# Patient Record
Sex: MALE | Race: WHITE | NOT HISPANIC OR LATINO | Employment: UNEMPLOYED | URBAN - METROPOLITAN AREA
[De-identification: names, ages, dates, MRNs, and addresses within clinical notes are randomized per-mention and may not be internally consistent; named-entity substitution may affect disease eponyms.]

---

## 2017-05-15 ENCOUNTER — HOSPITAL ENCOUNTER (EMERGENCY)
Facility: HOSPITAL | Age: 27
Discharge: HOME | End: 2017-05-16
Attending: EMERGENCY MEDICINE
Payer: COMMERCIAL

## 2017-05-15 DIAGNOSIS — Z98.2 VP (VENTRICULOPERITONEAL) SHUNT STATUS: ICD-10-CM

## 2017-05-15 DIAGNOSIS — R51.9 HEADACHE, UNSPECIFIED HEADACHE TYPE: Primary | ICD-10-CM

## 2017-05-15 PROCEDURE — 96372 THER/PROPH/DIAG INJ SC/IM: CPT

## 2017-05-15 PROCEDURE — 99283 EMERGENCY DEPT VISIT LOW MDM: CPT | Mod: 25

## 2017-05-15 RX ORDER — KETOROLAC TROMETHAMINE 30 MG/ML
60 INJECTION, SOLUTION INTRAMUSCULAR; INTRAVENOUS
Status: COMPLETED | OUTPATIENT
Start: 2017-05-16 | End: 2017-05-16

## 2017-05-15 NOTE — ED AVS SNAPSHOT
[facility]  [address] [facility] [address]               Horacio Price   5/15/2017 11:21 PM   ED    Description:  Male : 1990   Department:  [facility]           Your Care was Coordinated By:     Provider Role From To    Vanessa Hutchison MD Attending Provider 05/15/17 8515 --      Reason for Visit     Brain Shunt Problem           Diagnoses this Visit        Comments    Headache, unspecified headache type    -  Primary      (ventriculoperitoneal) shunt status           ED Disposition     ED Disposition Condition Comment    Discharge             To Do List           Follow-up Information     Follow up with Ailyn Tripp MD In 2 days.    Specialty:  Family Medicine    Contact information:    [address] [facility] [address]  [facility] [id number] [phone]          Follow up with [facility].    Specialty:  Emergency Medicine    Why:  As needed, If symptoms worsen    Contact information:    [address] [place] [id number] [phone]      [facility]     [facility] -  Assistance  Unless otherwise directed by your provider, please contact [facility], our nurse care line that is available for  assistance.     Registered nurses in the [facility] provide: appointment scheduling, clinical advisement, health education, and other advisory services.  Call: [phone] (toll free)               Medications           Message regarding Medications     Verify the changes and/or additions to your medication regime listed below are the same as discussed with your clinician today.  If any of these changes or additions are incorrect, please notify your healthcare provider.        These medications were administered today        Dose Freq    ketorolac injection 60 mg 60 mg ED 1 Time    Starting on: 2017    Sig: Inject 2 mLs (60 mg total) into the muscle ED 1  "Time.    Class: Normal    Route: Intramuscular    Non-formulary Exception Code: Defer to pharmacy           Verify that the below list of medications is an accurate representation of the medications you are currently taking.  If none reported, the list may be blank. If incorrect, please contact your healthcare provider. Carry this list with you in case of emergency.           Current Medications            Clinical Reference Information           Your Vitals Were     BP Pulse Temp Resp Height Weight    123/70 (BP Location: Right arm, Patient Position: Sitting) 62 98.5 °F (36.9 °C) (Oral) 18 5' 7" (1.702 m) 80.7 kg (178 lb)    SpO2 BMI             95% 27.88 kg/m2         Allergies as of 5/16/2017        Reactions    Biaxin [Clarithromycin] Rash    Rocephin [Ceftriaxone] Rash    Suprax [Cefixime] Rash      Immunizations Administered on Date of Encounter - 5/16/2017     None      ED Micro, Lab, POCT     None      ED Imaging Orders     Start Ordered       Status Ordering Provider    05/15/17 2346 05/15/17 2345  X-Ray Shunt Series  1 time imaging      In process         Discharge Instructions         Self-Care for Headaches  Most headaches aren't serious and can be relieved with self-care. But some headaches may be a sign of another health problem like eye trouble or high blood pressure. To find the best treatment, learn what kind of headaches you get. For tension headaches, self-care will usually help. To treat migraines, ask your healthcare provider for advice. It is also possible to get both tension and migraine headaches. Self-care involves relieving the pain and avoiding headache triggers if you can.    Ways to reduce pain and tension  Try these steps:  · Apply a cold compress or ice pack to the pain site.  · Drink fluids. If nausea makes it hard to drink, try sucking on ice.  · Rest. Protect yourself from bright light and loud noises.  · Calm your emotions by imagining a peaceful scene.  · Massage tight neck, " "shoulder, and head muscles.  · To relax muscles, soak in a hot bath or use a hot shower.  Use medicines  Aspirin or aspirin substitutes, such as ibuprofen and acetaminophen, can relieve headache. Remember: Never give aspirin to anyone 18 years old or younger because of the risk of developing Reye syndrome. Use pain medicines only when necessary.  Track your headaches  Keeping a headache diary can help you and your healthcare provider identify what's causing your headaches:  · Note when each headache happens.  · Identify your activities and the foods you've eaten 6 to 8 hours before the headache began.  · Look for any trends or "triggers."  Signs of tension headache  Any of the following can be signs:  · Dull pain or feeling of pressure in a tight band around your head  · Pain in your neck or shoulders  · Headache without a definite beginning or end  · Headache after an activity such as driving or working on a computer  Signs of migraine  Any of the following can be signs:  · Throbbing pain on one or both sides of your head  · Nausea or vomiting  · Extreme sensitivity to light, sound, and smells  · Bright spots, flashes, or other visual changes  · Pain or nausea so severe that you can't continue your daily activities  Call your healthcare provider   If you have any of the following symptoms, contact your healthcare provider:  · A headache that lingers after a recent injury or bump to the head.  · A fever with a stiff neck or pain when you bend your head toward your chest.  · A headache along with slurred speech, changes in your vision, or numbness or weakness in your arms or legs.  · A headache for longer than 3 days.  · Frequent headaches, especially in the morning.  · Headaches with seizures   · Seek immediate medical attention if you have a headache that you would call "the worst headache you have ever had."   Date Last Reviewed: 10/4/2015  © 7388-1883 [employer]. [address] [place] [address], [facility] " "[address]. All rights reserved. This information is not intended as a substitute for professional medical care. Always follow your healthcare professional's instructions.          Headache, Unspecified    A number of things can cause headaches. The cause of your headache isnt clear. But it doesnt seem to be a sign of any serious illness.  You could have a tension headache or a migraine headache.  Stress can cause a tension headache. This can happen if you tense the muscles of your shoulders, neck, and scalp without knowing it. If this stress lasts long enough, you may develop a tension headache.  It is not clear why migraines occur, but certain things called" triggers" can raise the risk of having a migraine attack. Migraine triggers may include emotional stress or depression, or by hormone changes during the menstrual cycle. Other triggers include birth control pills and other medicines, alcohol or caffeine, foods with tyramine (such as aged cheese, wine), eyestrain, weather changes, missed meals, and lack of sleep or oversleeping.  Other causes of headache include:  · Viral illness with high fever  · Head injury with concussion  · Sinus, ear, or throat infection  · Dental pain and jaw joint (TMJ) pain  More serious but less common causes of headache include stroke, brain hemorrhage, brain tumor, meningitis, and encephalitis.  Home care  Follow these tips when taking care of yourself at home:  · Dont drive yourself home if you were given pain medicine for your headache. Instead, have someone else drive you home. Try to sleep when you get home. You should feel much better when you wake up.  · Apply heat to the back of your neck to ease a neck muscle spasm. Take care of a migraine headache by putting an ice pack on your forehead or at the base of your skull.  · If you have nausea or vomiting, eat a light diet until your headache eases.  · If you have a migraine headache, use sunglasses when in the daylight or around " bright indoor lighting until your symptoms get better. Bright glaring light can make this type of headache worse.  Follow-up care  Follow up with your healthcare provider, or as advised. Talk with your provider if you have frequent headaches. He or she can help figure out a treatment plan. By knowing the earliest signs of headache, and starting treatment right away, you may be able to stop the pain yourself.  When to seek medical advice  Call your healthcare provider right away if any of these occur:  · Your head pain suddenly gets worse after sexual intercourse or strenuous activity  · Your head pain doesnt get better within 24 hours  · You arent able to keep liquids down (repeated vomiting)  · Fever of 100.4ºF (38ºC) or higher, or as directed by your healthcare provider  · Stiff neck  · Extreme drowsiness, confusion, or fainting  · Dizziness or dizziness with spinning sensation (vertigo)  · Weakness in an arm or leg or one side of your face  · You have trouble talking or seeing  Date Last Reviewed: 8/1/2016  © 5558-5596 [employer]. [address] [place] [address]. All rights reserved. This information is not intended as a substitute for professional medical care. Always follow your healthcare professional's instructions.          [facility] Sign-Up     Activating your [facility] account is as easy as 1-2-3!     1) Visit [email or web address], select Sign Up Now, enter this activation code and your date of birth, then select Next.  [email or web address]  Expires: 6/30/2017  1:44 AM      2) Create a username and password to use when you visit [facility] in the future and select a security question in case you lose your password and select Next.    3) Enter your e-mail address and click Sign Up!    Additional Information  If you have questions, please e-mail [email or web address] or call [phone] to talk to our [facility] staff. Remember, [facility] is NOT to be used for urgent needs. For medical  emergencies, dial 911.         Smoking Cessation     If you would like to quit smoking:   You may be eligible for free services if you are a [place] resident and started smoking cigarettes before September 1, 1988.  Call the Smoking Cessation Trust (SCT) toll free at (564) 821-1114 or (430) 791-0606.   Call 1-800-QUIT-NOW if you do not meet the above criteria.   Contact us via email: [email or web address]   View our website for more information: [email or web address]         [facility]  complies with applicable Federal civil rights laws and does not discriminate on the basis of race, color, national origin, age, disability, or sex.        Language Assistance Services     ATTENTION: Language assistance services are available, free of charge. Please call [phone].      ATENCIÓN: Si habla español, tiene a tena disposición servicios gratuitos de asistencia lingüística. Llame al [phone].     CHÚ Ý: N?u b?n nói Ti?ng Vi?t, có các d?ch v? h? tr? ngôn ng? mi?n phí dành cho b?n. G?i s? [phone].

## 2017-05-16 VITALS
WEIGHT: 178 LBS | OXYGEN SATURATION: 98 % | HEIGHT: 67 IN | DIASTOLIC BLOOD PRESSURE: 84 MMHG | RESPIRATION RATE: 18 BRPM | SYSTOLIC BLOOD PRESSURE: 127 MMHG | BODY MASS INDEX: 27.94 KG/M2 | TEMPERATURE: 99 F | HEART RATE: 71 BPM

## 2017-05-16 PROCEDURE — 63600175 PHARM REV CODE 636 W HCPCS: Performed by: EMERGENCY MEDICINE

## 2017-05-16 RX ADMIN — KETOROLAC TROMETHAMINE 60 MG: 60 INJECTION, SOLUTION INTRAMUSCULAR at 12:05

## 2017-05-16 NOTE — ED NOTES
In bed resting,aaox4,skin warm dry to touch,equal bilateral chest rise and fall,side rails up x 2,bed locked and in low position,instructed to call with any needs.

## 2017-05-16 NOTE — ED PROVIDER NOTES
"SCRIBE #1 NOTE: I, Yahaira Barr, am scribing for, and in the presence of, Vanessa Hutchison MD. I have scribed the entire note.      History      Chief Complaint   Patient presents with    Brain Shunt Problem     reports having brain shunt, mother reports having resistance with pumping the shunt up to relieve pressure. reports neurologist changed pain medications as well        Review of patient's allergies indicates:   Allergen Reactions    Biaxin [clarithromycin] Rash    Rocephin [ceftriaxone] Rash    Suprax [cefixime] Rash        HPI   HPI    5/15/2017, 11:46 PM   History obtained from the patient and mother      History of Present Illness: Horacio Price is a 26 y.o. male patient who presents to the Emergency Department for HA which onset gradually since this morning. Symptoms are constant and moderate in severity. Mother reports pt has been on migraine medication. Mother states pt saw Dr. Pop Pemberton last week when he increased his Topamax to 3 times a day and gave him new pain medication. Mother states she was unable to get medications filled. Mother states reports pumping the brain shunt to relieve pressure but believes the shunt isn't working because "the pump is not draining when it usually takes 2-5 minutes to refill and deflate". Pt states lights and loud noises exacerbates the pain. No mitigating or exacerbating factors reported. Associated sxs include dizziness and nausea. Patient denies any rash, CP, SOB, fever, chills, emesis, diarrhea, and all other sxs at this time. Prior Tx includes Tylenol with no relief. No further complaints or concerns at this time.         Arrival mode: Personal vehicle    PCP: Ailyn Tripp MD     Past Medical History:  Past medical history reviewed not relevant      Past Surgical History:  Past surgical history reviewed not relevant      Family History:  Family history reviewed not relevant      Social History:  Social History    Social History Main " Topics    Social History Main Topics    Smoking status: Unknown if ever smoked    Smokeless tobacco: Unknown if ever used    Alcohol Use: Unknown drinking history    Drug Use: Unknown if ever used    Sexual Activity: Unknown       ROS   Review of Systems   Constitutional: Negative for chills and fever.   HENT: Negative for sore throat.    Respiratory: Negative for shortness of breath.    Cardiovascular: Negative for chest pain.   Gastrointestinal: Positive for nausea. Negative for diarrhea and vomiting.   Genitourinary: Negative for dysuria.   Musculoskeletal: Negative for back pain.   Skin: Negative for rash.   Neurological: Positive for dizziness and headaches. Negative for weakness.   Hematological: Does not bruise/bleed easily.   All other systems reviewed and are negative.      Physical Exam    Initial Vitals   BP Pulse Resp Temp SpO2   05/15/17 2301 05/15/17 2301 05/15/17 2301 05/15/17 2301 05/15/17 2301   123/70 62 18 98.5 °F (36.9 °C) 95 %      Physical Exam  Nursing Notes and Vital Signs Reviewed.  Constitutional: Patient is in no acute distress. Well-developed and well-nourished.  Head: Atraumatic. Normocephalic.  Eyes: PERRL. EOM intact. Conjunctivae are not pale. No scleral icterus.  ENT: Mucous membranes are moist. Oropharynx is clear and symmetric.    Neck: Supple. Full ROM. No lymphadenopathy.  Cardiovascular: Regular rate. Regular rhythm. No murmurs, rubs, or gallops. Distal pulses are 2+ and symmetric.  Pulmonary/Chest: No respiratory distress. Clear to auscultation bilaterally. No wheezing, rales, or rhonchi.  Abdominal: Soft and non-distended.  There is no tenderness.  No rebound, guarding, or rigidity. Good bowel sounds.  Genitourinary: No CVA tenderness  Musculoskeletal: Moves all extremities. No obvious deformities. No edema. No calf tenderness.  Skin: Warm and dry.  Neurological:  Alert, awake, and appropriate.  Normal speech.  No acute focal neurological deficits are  "appreciated.  Psychiatric: Normal affect. Good eye contact. Appropriate in content.    ED Course    Procedures  ED Vital Signs:  Vitals:    05/15/17 2301 05/16/17 0152   BP: 123/70 127/84   Pulse: 62 71   Resp: 18 18   Temp: 98.5 °F (36.9 °C)    TempSrc: Oral    SpO2: 95% 98%   Weight: 80.7 kg (178 lb)    Height: 5' 7" (1.702 m)        Imaging Results:  Imaging Results         X-Ray Shunt Series (In process) Per Virtual radiology, pt's CT results the left-sided  shunt appears intact.                    The Emergency Provider reviewed the vital signs and test results, which are outlined above.    ED Discussion     1:15 AM: Re-evaluated pt. Pt is resting comfortably and is in no acute distress.  Pt states he "feels a lot better".  D/w pt all pertinent results. D/w pt any concerns expressed at this time. Answered all questions. Pt expresses understanding at this time.    1:53 AM:Discussed with pt all pertinent ED information and results. Discussed pt dx and plan of tx. Gave pt all f/u and return to the ED instructions. All questions and concerns were addressed at this time. Pt expresses understanding of information and instructions, and is comfortable with plan to discharge. Pt is stable for discharge.        ED Medication(s):  Medications   ketorolac injection 60 mg (60 mg Intramuscular Given 5/16/17 0017)       There are no discharge medications for this patient.      Follow-up Information     Follow up with Ailyn Tripp MD In 2 days.    Specialty:  Family Medicine    Contact information:    [address] [facility] [address]  [facility] [address] [phone]          Follow up with [facility].    Specialty:  Emergency Medicine    Why:  As needed, If symptoms worsen    Contact information:    [address]  [facility] [address] [phone]            Medical Decision Making    Medical Decision Making:   Clinical Tests:   Radiological Study: Ordered and Reviewed           Scribe " Attestation:   Scribe #1: I performed the above scribed service and the documentation accurately describes the services I performed. I attest to the accuracy of the note.    Attending:   Physician Attestation Statement for Scribe #1: I, Vanessa Hutchison MD, personally performed the services described in this documentation, as scribed by Yahaira Barr, in my presence, and it is both accurate and complete.          Clinical Impression       ICD-10-CM ICD-9-CM   1. Headache, unspecified headache type R51 784.0   2.  (ventriculoperitoneal) shunt status Z98.2 V45.2       Disposition:   Disposition: Discharged  Condition: Stable         Vanessa Hutchison MD  05/17/17 0128

## 2017-05-16 NOTE — DISCHARGE INSTRUCTIONS
"  Self-Care for Headaches  Most headaches aren't serious and can be relieved with self-care. But some headaches may be a sign of another health problem like eye trouble or high blood pressure. To find the best treatment, learn what kind of headaches you get. For tension headaches, self-care will usually help. To treat migraines, ask your healthcare provider for advice. It is also possible to get both tension and migraine headaches. Self-care involves relieving the pain and avoiding headache triggers if you can.    Ways to reduce pain and tension  Try these steps:  · Apply a cold compress or ice pack to the pain site.  · Drink fluids. If nausea makes it hard to drink, try sucking on ice.  · Rest. Protect yourself from bright light and loud noises.  · Calm your emotions by imagining a peaceful scene.  · Massage tight neck, shoulder, and head muscles.  · To relax muscles, soak in a hot bath or use a hot shower.  Use medicines  Aspirin or aspirin substitutes, such as ibuprofen and acetaminophen, can relieve headache. Remember: Never give aspirin to anyone 18 years old or younger because of the risk of developing Reye syndrome. Use pain medicines only when necessary.  Track your headaches  Keeping a headache diary can help you and your healthcare provider identify what's causing your headaches:  · Note when each headache happens.  · Identify your activities and the foods you've eaten 6 to 8 hours before the headache began.  · Look for any trends or "triggers."  Signs of tension headache  Any of the following can be signs:  · Dull pain or feeling of pressure in a tight band around your head  · Pain in your neck or shoulders  · Headache without a definite beginning or end  · Headache after an activity such as driving or working on a computer  Signs of migraine  Any of the following can be signs:  · Throbbing pain on one or both sides of your head  · Nausea or vomiting  · Extreme sensitivity to light, sound, and " "smells  · Bright spots, flashes, or other visual changes  · Pain or nausea so severe that you can't continue your daily activities  Call your healthcare provider   If you have any of the following symptoms, contact your healthcare provider:  · A headache that lingers after a recent injury or bump to the head.  · A fever with a stiff neck or pain when you bend your head toward your chest.  · A headache along with slurred speech, changes in your vision, or numbness or weakness in your arms or legs.  · A headache for longer than 3 days.  · Frequent headaches, especially in the morning.  · Headaches with seizures   · Seek immediate medical attention if you have a headache that you would call "the worst headache you have ever had."   Date Last Reviewed: 10/4/2015  © 0380-8418 [employer]. [address] [place] [address], [place] [address]. All rights reserved. This information is not intended as a substitute for professional medical care. Always follow your healthcare professional's instructions.          Headache, Unspecified    A number of things can cause headaches. The cause of your headache isnt clear. But it doesnt seem to be a sign of any serious illness.  You could have a tension headache or a migraine headache.  Stress can cause a tension headache. This can happen if you tense the muscles of your shoulders, neck, and scalp without knowing it. If this stress lasts long enough, you may develop a tension headache.  It is not clear why migraines occur, but certain things called" triggers" can raise the risk of having a migraine attack. Migraine triggers may include emotional stress or depression, or by hormone changes during the menstrual cycle. Other triggers include birth control pills and other medicines, alcohol or caffeine, foods with tyramine (such as aged cheese, wine), eyestrain, weather changes, missed meals, and lack of sleep or oversleeping.  Other causes of headache include:  · Viral illness with " high fever  · Head injury with concussion  · Sinus, ear, or throat infection  · Dental pain and jaw joint (TMJ) pain  More serious but less common causes of headache include stroke, brain hemorrhage, brain tumor, meningitis, and encephalitis.  Home care  Follow these tips when taking care of yourself at home:  · Dont drive yourself home if you were given pain medicine for your headache. Instead, have someone else drive you home. Try to sleep when you get home. You should feel much better when you wake up.  · Apply heat to the back of your neck to ease a neck muscle spasm. Take care of a migraine headache by putting an ice pack on your forehead or at the base of your skull.  · If you have nausea or vomiting, eat a light diet until your headache eases.  · If you have a migraine headache, use sunglasses when in the daylight or around bright indoor lighting until your symptoms get better. Bright glaring light can make this type of headache worse.  Follow-up care  Follow up with your healthcare provider, or as advised. Talk with your provider if you have frequent headaches. He or she can help figure out a treatment plan. By knowing the earliest signs of headache, and starting treatment right away, you may be able to stop the pain yourself.  When to seek medical advice  Call your healthcare provider right away if any of these occur:  · Your head pain suddenly gets worse after sexual intercourse or strenuous activity  · Your head pain doesnt get better within 24 hours  · You arent able to keep liquids down (repeated vomiting)  · Fever of 100.4ºF (38ºC) or higher, or as directed by your healthcare provider  · Stiff neck  · Extreme drowsiness, confusion, or fainting  · Dizziness or dizziness with spinning sensation (vertigo)  · Weakness in an arm or leg or one side of your face  · You have trouble talking or seeing  Date Last Reviewed: 8/1/2016 © 2000-2016 [employer]. [address] [place] [address], [place]  [address]. All rights reserved. This information is not intended as a substitute for professional medical care. Always follow your healthcare professional's instructions.

## 2019-06-19 ENCOUNTER — LAB VISIT (OUTPATIENT)
Dept: LAB | Facility: HOSPITAL | Age: 29
End: 2019-06-19
Attending: PSYCHIATRY & NEUROLOGY
Payer: COMMERCIAL

## 2019-06-19 DIAGNOSIS — G40.409 EPILEPSY SYMPTOMATIC, GENERALIZED: ICD-10-CM

## 2019-06-19 LAB
ALBUMIN SERPL BCP-MCNC: 4.8 G/DL (ref 3.5–5.2)
ALP SERPL-CCNC: 104 U/L (ref 55–135)
ALT SERPL W/O P-5'-P-CCNC: 10 U/L (ref 10–44)
ANION GAP SERPL CALC-SCNC: 7 MMOL/L (ref 8–16)
AST SERPL-CCNC: 13 U/L (ref 10–40)
BASOPHILS # BLD AUTO: 0.07 K/UL (ref 0–0.2)
BASOPHILS NFR BLD: 0.9 % (ref 0–1.9)
BILIRUB SERPL-MCNC: 0.5 MG/DL (ref 0.1–1)
BUN SERPL-MCNC: 12 MG/DL (ref 6–20)
CALCIUM SERPL-MCNC: 9.6 MG/DL (ref 8.7–10.5)
CHLORIDE SERPL-SCNC: 110 MMOL/L (ref 95–110)
CO2 SERPL-SCNC: 22 MMOL/L (ref 23–29)
CREAT SERPL-MCNC: 1.2 MG/DL (ref 0.5–1.4)
CRP SERPL-MCNC: 0.9 MG/L (ref 0–8.2)
D DIMER PPP IA.FEU-MCNC: 0.28 MG/L FEU
DIFFERENTIAL METHOD: NORMAL
EOSINOPHIL # BLD AUTO: 0.1 K/UL (ref 0–0.5)
EOSINOPHIL NFR BLD: 0.9 % (ref 0–8)
ERYTHROCYTE [DISTWIDTH] IN BLOOD BY AUTOMATED COUNT: 13.9 % (ref 11.5–14.5)
ERYTHROCYTE [SEDIMENTATION RATE] IN BLOOD BY WESTERGREN METHOD: 9 MM/HR (ref 0–10)
EST. GFR  (AFRICAN AMERICAN): >60 ML/MIN/1.73 M^2
EST. GFR  (NON AFRICAN AMERICAN): >60 ML/MIN/1.73 M^2
ESTIMATED AVG GLUCOSE: 111 MG/DL (ref 68–131)
GLUCOSE SERPL-MCNC: 99 MG/DL (ref 70–110)
HBA1C MFR BLD HPLC: 5.5 % (ref 4–5.6)
HCT VFR BLD AUTO: 42.3 % (ref 40–54)
HGB BLD-MCNC: 14.1 G/DL (ref 14–18)
LYMPHOCYTES # BLD AUTO: 2.2 K/UL (ref 1–4.8)
LYMPHOCYTES NFR BLD: 29.9 % (ref 18–48)
MCH RBC QN AUTO: 29.1 PG (ref 27–31)
MCHC RBC AUTO-ENTMCNC: 33.3 G/DL (ref 32–36)
MCV RBC AUTO: 87 FL (ref 82–98)
MONOCYTES # BLD AUTO: 0.3 K/UL (ref 0.3–1)
MONOCYTES NFR BLD: 4.4 % (ref 4–15)
NEUTROPHILS # BLD AUTO: 4.8 K/UL (ref 1.8–7.7)
NEUTROPHILS NFR BLD: 63.9 % (ref 38–73)
PLATELET # BLD AUTO: 320 K/UL (ref 150–350)
PMV BLD AUTO: 10.6 FL (ref 9.2–12.9)
POTASSIUM SERPL-SCNC: 4.3 MMOL/L (ref 3.5–5.1)
PROLACTIN SERPL IA-MCNC: 5.4 NG/ML (ref 3.5–19.4)
PROT SERPL-MCNC: 8 G/DL (ref 6–8.4)
RBC # BLD AUTO: 4.84 M/UL (ref 4.6–6.2)
SODIUM SERPL-SCNC: 139 MMOL/L (ref 136–145)
TSH SERPL DL<=0.005 MIU/L-ACNC: 0.58 UIU/ML (ref 0.4–4)
VIT B12 SERPL-MCNC: 620 PG/ML (ref 210–950)
WBC # BLD AUTO: 7.48 K/UL (ref 3.9–12.7)

## 2019-06-19 PROCEDURE — 85651 RBC SED RATE NONAUTOMATED: CPT

## 2019-06-19 PROCEDURE — 36415 COLL VENOUS BLD VENIPUNCTURE: CPT

## 2019-06-19 PROCEDURE — 80053 COMPREHEN METABOLIC PANEL: CPT

## 2019-06-19 PROCEDURE — 80323 ALKALOIDS NOS: CPT

## 2019-06-19 PROCEDURE — 80201 ASSAY OF TOPIRAMATE: CPT

## 2019-06-19 PROCEDURE — 85025 COMPLETE CBC W/AUTO DIFF WBC: CPT

## 2019-06-19 PROCEDURE — 82607 VITAMIN B-12: CPT

## 2019-06-19 PROCEDURE — 84443 ASSAY THYROID STIM HORMONE: CPT

## 2019-06-19 PROCEDURE — 83036 HEMOGLOBIN GLYCOSYLATED A1C: CPT

## 2019-06-19 PROCEDURE — 86592 SYPHILIS TEST NON-TREP QUAL: CPT

## 2019-06-19 PROCEDURE — 85379 FIBRIN DEGRADATION QUANT: CPT

## 2019-06-19 PROCEDURE — 86140 C-REACTIVE PROTEIN: CPT

## 2019-06-19 PROCEDURE — 84146 ASSAY OF PROLACTIN: CPT

## 2019-06-20 LAB — RPR SER QL: NORMAL

## 2019-06-21 LAB
ANABASINE UR-MCNC: <2 NG/ML
COTININE UR-MCNC: <5 NG/ML
NICOTINE UR-MCNC: <5 NG/ML
NORNICOTINE UR-MCNC: <2 NG/ML

## 2019-06-24 LAB — TOPIRAMATE SERPL-MCNC: 14.3 MCG/ML

## 2020-01-20 ENCOUNTER — LAB VISIT (OUTPATIENT)
Dept: LAB | Facility: HOSPITAL | Age: 30
End: 2020-01-20
Attending: PSYCHIATRY & NEUROLOGY
Payer: COMMERCIAL

## 2020-01-20 DIAGNOSIS — G40.409 EPILEPSY SYMPTOMATIC, GENERALIZED: ICD-10-CM

## 2020-01-20 LAB
ALBUMIN SERPL BCP-MCNC: 4.6 G/DL (ref 3.5–5.2)
ALP SERPL-CCNC: 129 U/L (ref 55–135)
ALT SERPL W/O P-5'-P-CCNC: 9 U/L (ref 10–44)
ANION GAP SERPL CALC-SCNC: 10 MMOL/L (ref 8–16)
AST SERPL-CCNC: 10 U/L (ref 10–40)
BASOPHILS # BLD AUTO: 0.1 K/UL (ref 0–0.2)
BASOPHILS NFR BLD: 1.2 % (ref 0–1.9)
BILIRUB SERPL-MCNC: 0.2 MG/DL (ref 0.1–1)
BUN SERPL-MCNC: 10 MG/DL (ref 6–20)
CALCIUM SERPL-MCNC: 9.2 MG/DL (ref 8.7–10.5)
CHLORIDE SERPL-SCNC: 111 MMOL/L (ref 95–110)
CO2 SERPL-SCNC: 18 MMOL/L (ref 23–29)
CREAT SERPL-MCNC: 1.1 MG/DL (ref 0.5–1.4)
DIFFERENTIAL METHOD: ABNORMAL
EOSINOPHIL # BLD AUTO: 0.3 K/UL (ref 0–0.5)
EOSINOPHIL NFR BLD: 3.9 % (ref 0–8)
ERYTHROCYTE [DISTWIDTH] IN BLOOD BY AUTOMATED COUNT: 13.2 % (ref 11.5–14.5)
EST. GFR  (AFRICAN AMERICAN): >60 ML/MIN/1.73 M^2
EST. GFR  (NON AFRICAN AMERICAN): >60 ML/MIN/1.73 M^2
GLUCOSE SERPL-MCNC: 85 MG/DL (ref 70–110)
HCT VFR BLD AUTO: 42.4 % (ref 40–54)
HGB BLD-MCNC: 13.9 G/DL (ref 14–18)
IMM GRANULOCYTES # BLD AUTO: 0.02 K/UL (ref 0–0.04)
IMM GRANULOCYTES NFR BLD AUTO: 0.2 % (ref 0–0.5)
LYMPHOCYTES # BLD AUTO: 1.2 K/UL (ref 1–4.8)
LYMPHOCYTES NFR BLD: 15 % (ref 18–48)
MCH RBC QN AUTO: 29.2 PG (ref 27–31)
MCHC RBC AUTO-ENTMCNC: 32.8 G/DL (ref 32–36)
MCV RBC AUTO: 89 FL (ref 82–98)
MONOCYTES # BLD AUTO: 0.5 K/UL (ref 0.3–1)
MONOCYTES NFR BLD: 6 % (ref 4–15)
NEUTROPHILS # BLD AUTO: 6.1 K/UL (ref 1.8–7.7)
NEUTROPHILS NFR BLD: 73.7 % (ref 38–73)
NRBC BLD-RTO: 0 /100 WBC
PLATELET # BLD AUTO: 305 K/UL (ref 150–350)
PMV BLD AUTO: 11.4 FL (ref 9.2–12.9)
POTASSIUM SERPL-SCNC: 3.9 MMOL/L (ref 3.5–5.1)
PROLACTIN SERPL IA-MCNC: 7.5 NG/ML (ref 3.5–19.4)
PROT SERPL-MCNC: 8.1 G/DL (ref 6–8.4)
RBC # BLD AUTO: 4.76 M/UL (ref 4.6–6.2)
SODIUM SERPL-SCNC: 139 MMOL/L (ref 136–145)
WBC # BLD AUTO: 8.22 K/UL (ref 3.9–12.7)

## 2020-01-20 PROCEDURE — 85025 COMPLETE CBC W/AUTO DIFF WBC: CPT

## 2020-01-20 PROCEDURE — 84146 ASSAY OF PROLACTIN: CPT

## 2020-01-20 PROCEDURE — 36415 COLL VENOUS BLD VENIPUNCTURE: CPT | Mod: PO

## 2020-01-20 PROCEDURE — 80177 DRUG SCRN QUAN LEVETIRACETAM: CPT

## 2020-01-20 PROCEDURE — 80053 COMPREHEN METABOLIC PANEL: CPT

## 2020-01-20 PROCEDURE — 80201 ASSAY OF TOPIRAMATE: CPT

## 2020-01-22 LAB — LEVETIRACETAM SERPL-MCNC: 8.9 UG/ML (ref 3–60)

## 2020-01-23 LAB — TOPIRAMATE SERPL-MCNC: 12.9 MCG/ML

## 2021-05-06 ENCOUNTER — PATIENT MESSAGE (OUTPATIENT)
Dept: RESEARCH | Facility: HOSPITAL | Age: 31
End: 2021-05-06

## 2021-08-11 PROBLEM — G40.909 SEIZURE DISORDER: Status: ACTIVE | Noted: 2019-06-05

## 2021-08-11 PROBLEM — F41.1 GAD (GENERALIZED ANXIETY DISORDER): Status: ACTIVE | Noted: 2017-01-26
